# Patient Record
Sex: FEMALE | Race: WHITE | NOT HISPANIC OR LATINO | Employment: FULL TIME | URBAN - NONMETROPOLITAN AREA
[De-identification: names, ages, dates, MRNs, and addresses within clinical notes are randomized per-mention and may not be internally consistent; named-entity substitution may affect disease eponyms.]

---

## 2023-03-23 DIAGNOSIS — I10 HYPERTENSION, UNSPECIFIED TYPE: Primary | ICD-10-CM

## 2023-03-23 RX ORDER — AMLODIPINE BESYLATE 2.5 MG/1
TABLET ORAL
Qty: 90 TABLET | Refills: 3 | Status: SHIPPED | OUTPATIENT
Start: 2023-03-23 | End: 2023-08-31 | Stop reason: ALTCHOICE

## 2023-03-27 ENCOUNTER — TELEPHONE (OUTPATIENT)
Dept: PRIMARY CARE | Facility: CLINIC | Age: 58
End: 2023-03-27
Payer: COMMERCIAL

## 2023-03-27 NOTE — TELEPHONE ENCOUNTER
FAXED PAPER ORDER FOR RIGHT BREAST US LIMITED TO [facility] [phone] FAX. (THEIR PHONE # [phone]). TASK COMPLETE

## 2023-03-27 NOTE — TELEPHONE ENCOUNTER
WE DO NOT DO ORDERS IN THE SYSTEM. IF THEY WANT THE ORDER IN THE SYSTEM THE DOCTOR HAS TO DO IT OR YOU CAN FAX A PAPER ORDER

## 2023-03-27 NOTE — TELEPHONE ENCOUNTER
EMELIA [facility] BREAST HEALTH NURSE CALLED NEEDING ORDER PUT IN FOR U/S RIGHT BREAST LIMITED. PT IS COMING FOR U/S TOMORROW. THANKS

## 2023-05-22 ENCOUNTER — APPOINTMENT (OUTPATIENT)
Dept: PRIMARY CARE | Facility: CLINIC | Age: 58
End: 2023-05-22
Payer: COMMERCIAL

## 2023-06-16 PROBLEM — R09.A2 SENSATION OF LUMP IN THROAT: Status: ACTIVE | Noted: 2023-06-16

## 2023-06-16 PROBLEM — N93.9 ABNORMAL UTERINE BLEEDING (AUB): Status: ACTIVE | Noted: 2023-06-16

## 2023-06-16 PROBLEM — F41.1 ANXIETY STATE: Status: ACTIVE | Noted: 2023-06-16

## 2023-06-16 PROBLEM — E61.1 IRON DEFICIENCY: Status: ACTIVE | Noted: 2023-06-16

## 2023-06-16 PROBLEM — R73.02 GLUCOSE INTOLERANCE (IMPAIRED GLUCOSE TOLERANCE): Status: ACTIVE | Noted: 2023-06-16

## 2023-06-16 PROBLEM — G56.00 CARPAL TUNNEL SYNDROME: Status: ACTIVE | Noted: 2023-06-16

## 2023-06-16 PROBLEM — N39.3 SUI (STRESS URINARY INCONTINENCE, FEMALE): Status: ACTIVE | Noted: 2023-06-16

## 2023-06-16 PROBLEM — E65 FAT PAD: Status: ACTIVE | Noted: 2023-06-16

## 2023-06-16 PROBLEM — M62.830 LUMBAR PARASPINAL MUSCLE SPASM: Status: ACTIVE | Noted: 2023-06-16

## 2023-06-16 PROBLEM — G44.209 TENSION HEADACHE: Status: ACTIVE | Noted: 2023-06-16

## 2023-06-16 PROBLEM — N28.1 RENAL CYST: Status: ACTIVE | Noted: 2023-06-16

## 2023-06-16 PROBLEM — A04.72 C. DIFFICILE DIARRHEA: Status: ACTIVE | Noted: 2023-06-16

## 2023-06-16 PROBLEM — K57.32 DIVERTICULITIS, COLON: Status: ACTIVE | Noted: 2023-06-16

## 2023-06-16 PROBLEM — R10.9 RIGHT FLANK PAIN: Status: ACTIVE | Noted: 2023-06-16

## 2023-06-16 PROBLEM — K59.00 CONSTIPATION: Status: ACTIVE | Noted: 2023-06-16

## 2023-06-16 PROBLEM — M54.6 THORACIC BACK PAIN: Status: ACTIVE | Noted: 2023-06-16

## 2023-06-16 PROBLEM — R11.0 NAUSEA IN ADULT: Status: ACTIVE | Noted: 2023-06-16

## 2023-06-16 PROBLEM — R22.0 HEAD LUMP: Status: ACTIVE | Noted: 2023-06-16

## 2023-06-16 PROBLEM — N60.19 FIBROCYSTIC BREAST: Status: ACTIVE | Noted: 2023-06-16

## 2023-06-16 PROBLEM — Z86.16 HISTORY OF 2019 NOVEL CORONAVIRUS DISEASE (COVID-19): Status: ACTIVE | Noted: 2023-06-16

## 2023-06-16 PROBLEM — R59.0 CERVICAL LYMPHADENOPATHY: Status: ACTIVE | Noted: 2023-06-16

## 2023-06-16 PROBLEM — E04.1 THYROID NODULE: Status: ACTIVE | Noted: 2023-06-16

## 2023-06-16 PROBLEM — N95.1 PERIMENOPAUSAL: Status: ACTIVE | Noted: 2023-06-16

## 2023-06-16 PROBLEM — E04.2 MULTIPLE THYROID NODULES: Status: ACTIVE | Noted: 2023-06-16

## 2023-06-16 PROBLEM — M47.815 OSTEOARTHRITIS OF THORACOLUMBAR SPINE: Status: ACTIVE | Noted: 2023-06-16

## 2023-06-16 PROBLEM — I10 WHITE COAT SYNDROME WITH DIAGNOSIS OF HYPERTENSION: Status: ACTIVE | Noted: 2023-06-16

## 2023-06-16 PROBLEM — K52.9 COLITIS: Status: ACTIVE | Noted: 2023-06-16

## 2023-06-16 PROBLEM — M54.50 LOW BACK PAIN: Status: ACTIVE | Noted: 2023-06-16

## 2023-06-16 PROBLEM — R93.5 ABNORMAL CT OF THE ABDOMEN: Status: ACTIVE | Noted: 2023-06-16

## 2023-06-16 PROBLEM — R35.1 NOCTURIA: Status: ACTIVE | Noted: 2023-06-16

## 2023-06-16 PROBLEM — N39.0 RECURRENT UTI: Status: ACTIVE | Noted: 2023-06-16

## 2023-06-16 RX ORDER — LISINOPRIL 2.5 MG/1
2.5 TABLET ORAL DAILY
COMMUNITY
Start: 2022-06-21 | End: 2023-08-31 | Stop reason: SDUPTHER

## 2023-06-16 RX ORDER — MULTIVITAMIN
1 TABLET ORAL DAILY
COMMUNITY

## 2023-06-19 RX ORDER — AMLODIPINE BESYLATE 5 MG/1
5 TABLET ORAL DAILY
COMMUNITY
End: 2023-09-20

## 2023-06-22 ENCOUNTER — TELEPHONE (OUTPATIENT)
Dept: PRIMARY CARE | Facility: CLINIC | Age: 58
End: 2023-06-22

## 2023-06-22 ENCOUNTER — OFFICE VISIT (OUTPATIENT)
Dept: PRIMARY CARE | Facility: CLINIC | Age: 58
End: 2023-06-22
Payer: COMMERCIAL

## 2023-06-22 VITALS
HEART RATE: 74 BPM | WEIGHT: 132 LBS | BODY MASS INDEX: 24.29 KG/M2 | HEIGHT: 62 IN | DIASTOLIC BLOOD PRESSURE: 82 MMHG | SYSTOLIC BLOOD PRESSURE: 140 MMHG

## 2023-06-22 DIAGNOSIS — Z91.81 H/O FALL: ICD-10-CM

## 2023-06-22 DIAGNOSIS — E04.2 MULTINODULAR GOITER: ICD-10-CM

## 2023-06-22 DIAGNOSIS — R10.2 PELVIC PAIN: ICD-10-CM

## 2023-06-22 DIAGNOSIS — M53.3 SACRAL PAIN: ICD-10-CM

## 2023-06-22 DIAGNOSIS — R07.89 CHEST PAIN, ATYPICAL: Primary | ICD-10-CM

## 2023-06-22 PROCEDURE — 3079F DIAST BP 80-89 MM HG: CPT | Performed by: INTERNAL MEDICINE

## 2023-06-22 PROCEDURE — 3077F SYST BP >= 140 MM HG: CPT | Performed by: INTERNAL MEDICINE

## 2023-06-22 PROCEDURE — 1036F TOBACCO NON-USER: CPT | Performed by: INTERNAL MEDICINE

## 2023-06-22 PROCEDURE — 93000 ELECTROCARDIOGRAM COMPLETE: CPT | Performed by: INTERNAL MEDICINE

## 2023-06-22 PROCEDURE — 99214 OFFICE O/P EST MOD 30 MIN: CPT | Performed by: INTERNAL MEDICINE

## 2023-06-22 ASSESSMENT — ENCOUNTER SYMPTOMS
FEVER: 0
ENDOCRINE NEGATIVE: 1
HEADACHES: 0
NAUSEA: 0
ABDOMINAL DISTENTION: 0
EYES NEGATIVE: 1
DYSURIA: 0
NEUROLOGICAL NEGATIVE: 1
DIZZINESS: 0
CONSTIPATION: 0
CHILLS: 0
WHEEZING: 0
SHORTNESS OF BREATH: 0
VOMITING: 0
BACK PAIN: 1
AGITATION: 0
PSYCHIATRIC NEGATIVE: 1
ABDOMINAL PAIN: 0
RHINORRHEA: 0
WEAKNESS: 0
PALPITATIONS: 0
DIARRHEA: 0
GASTROINTESTINAL NEGATIVE: 1
COUGH: 0
LIGHT-HEADEDNESS: 0

## 2023-06-22 NOTE — TELEPHONE ENCOUNTER
PRE CERT NUCLEAR STRESS TEST  SHE WANTS TO MAKE F/U APPT WHEN YOU CALL HER DUE TO HER WORK SCHEDULE. THANKS

## 2023-06-22 NOTE — PROGRESS NOTES
Subjective   Patient ID: Dolores Evangelista is a 57 y.o. female who presents for 4 month follow up (Rev labs. ).  HPI  Lab and THYROID U/S F/U. U/S WAS DONE 5 MONTHS AGO. COMPLAINS OF L SIDED CHEST PAIN ON AND OFF 4/10 ,LASTING FOR AROUND 5 MINUTES . Pin is l sided with radiations to L ARM  S/P FALL 5 DAYS AGO (SLIPPED ON MUDDY GROUND) WITH TRAUMA TO LOWER BACK . COMPLAINS OF SACRAL AND LOW BACK PAIN SINCE THEN 6/10 AND R PELVIC BONE PAIN 5/10.  Review of Systems   Constitutional:  Negative for chills and fever.   HENT: Negative.  Negative for congestion, postnasal drip and rhinorrhea.    Eyes: Negative.  Negative for visual disturbance.   Respiratory:  Negative for cough, shortness of breath and wheezing.    Cardiovascular:  Positive for chest pain. Negative for palpitations and leg swelling.   Gastrointestinal: Negative.  Negative for abdominal distention, abdominal pain, constipation, diarrhea, nausea and vomiting.   Endocrine: Negative.    Genitourinary:  Negative for dysuria and urgency.   Musculoskeletal:  Positive for back pain.   Skin: Negative.  Negative for rash.   Allergic/Immunologic: Negative for immunocompromised state.   Neurological: Negative.  Negative for dizziness, weakness, light-headedness and headaches.   Psychiatric/Behavioral: Negative.  Negative for agitation.        Objective   Physical Exam  Constitutional:       General: She is not in acute distress.  HENT:      Head: Normocephalic.      Nose: Nose normal.      Mouth/Throat:      Mouth: Mucous membranes are moist.   Eyes:      Conjunctiva/sclera: Conjunctivae normal.      Pupils: Pupils are equal, round, and reactive to light.   Cardiovascular:      Rate and Rhythm: Normal rate and regular rhythm.      Pulses: Normal pulses.      Heart sounds: Normal heart sounds.      Comments: NO CHEST WALL TENDERNESS  Pulmonary:      Effort: No respiratory distress.      Breath sounds: No wheezing.   Chest:      Chest wall: No tenderness.   Abdominal:       General: Abdomen is flat. Bowel sounds are normal.      Palpations: Abdomen is soft.      Tenderness: There is no abdominal tenderness.   Musculoskeletal:         General: Tenderness present. Normal range of motion.      Cervical back: Normal range of motion.      Comments: SACRAL TENDERNESS, NO BRUISES   Lymphadenopathy:      Cervical: No cervical adenopathy.   Skin:     General: Skin is warm and dry.      Findings: No rash.   Neurological:      General: No focal deficit present.      Mental Status: She is alert. Mental status is at baseline.   Psychiatric:         Mood and Affect: Mood normal.         Behavior: Behavior normal.         Assessment/Plan   1. Chest pain, atypical  ECG 12 lead    XR chest 2 views    Nuclear Stress Test      2. Sacral pain  XR lumbar spine 2-3 views      3. Pelvic pain  XR pelvis 3+ views      4. H/O fall  XR lumbar spine 2-3 views    XR pelvis 3+ views      5. Multinodular goiter          ADVISED TO HAVE LOW FAT AND LOW CALORIE DIET AND TO LOOSE WEIGHT, DAILY EXERCISE.  ADVISED TO GO TO ER IF CHEST PAIN GETS WORSE.   ADVISED TO APPLY WARM COMPRESSION AND OTC PAIN CREAM PRN FOR PAIN.  ADVISED TO HAVE LOW FAT AND LOW CALORIE DIET AND TO LOOSE WEIGHT, DAILY EXERCISE.    MDM    1) COMPLEXITY: 1 UNDIAGNOSED NEW PROBLEM WITH UNCERTAIN PROGNOSIS  2)DATA: TESTS INTERPRETED AND OR ORDERED, TOOK INDEPENDENT HISTORY OR RECORDS REVIEWED  3)RISK: MODERATE RISK DUE TO NATURE OF MEDICAL CONDITIONS/COMORBIDITY OR MEDICATIONS ORDERED OR SURGICAL OR PROCEDURE REFERRAL, .       3 weeks

## 2023-07-03 ENCOUNTER — TELEPHONE (OUTPATIENT)
Dept: PRIMARY CARE | Facility: CLINIC | Age: 58
End: 2023-07-03
Payer: COMMERCIAL

## 2023-08-31 ENCOUNTER — OFFICE VISIT (OUTPATIENT)
Dept: PRIMARY CARE | Facility: CLINIC | Age: 58
End: 2023-08-31
Payer: COMMERCIAL

## 2023-08-31 VITALS
WEIGHT: 133 LBS | HEIGHT: 62 IN | DIASTOLIC BLOOD PRESSURE: 90 MMHG | BODY MASS INDEX: 24.48 KG/M2 | HEART RATE: 84 BPM | SYSTOLIC BLOOD PRESSURE: 148 MMHG

## 2023-08-31 DIAGNOSIS — I10 WHITE COAT SYNDROME WITH DIAGNOSIS OF HYPERTENSION: ICD-10-CM

## 2023-08-31 DIAGNOSIS — M62.838 MUSCLE SPASM: ICD-10-CM

## 2023-08-31 DIAGNOSIS — R07.89 CHEST WALL PAIN: Primary | ICD-10-CM

## 2023-08-31 DIAGNOSIS — I10 PRIMARY HYPERTENSION: ICD-10-CM

## 2023-08-31 PROBLEM — K52.9 COLITIS: Status: RESOLVED | Noted: 2023-06-16 | Resolved: 2023-08-31

## 2023-08-31 PROBLEM — R10.9 RIGHT FLANK PAIN: Status: RESOLVED | Noted: 2023-06-16 | Resolved: 2023-08-31

## 2023-08-31 PROBLEM — K57.32 DIVERTICULITIS, COLON: Status: RESOLVED | Noted: 2023-06-16 | Resolved: 2023-08-31

## 2023-08-31 PROBLEM — R59.0 CERVICAL LYMPHADENOPATHY: Status: RESOLVED | Noted: 2023-06-16 | Resolved: 2023-08-31

## 2023-08-31 PROBLEM — R10.2 PELVIC PAIN: Status: RESOLVED | Noted: 2023-06-22 | Resolved: 2023-08-31

## 2023-08-31 PROBLEM — R11.0 NAUSEA IN ADULT: Status: RESOLVED | Noted: 2023-06-16 | Resolved: 2023-08-31

## 2023-08-31 PROBLEM — R09.A2 SENSATION OF LUMP IN THROAT: Status: RESOLVED | Noted: 2023-06-16 | Resolved: 2023-08-31

## 2023-08-31 PROCEDURE — 3080F DIAST BP >= 90 MM HG: CPT | Performed by: INTERNAL MEDICINE

## 2023-08-31 PROCEDURE — 1036F TOBACCO NON-USER: CPT | Performed by: INTERNAL MEDICINE

## 2023-08-31 PROCEDURE — 3077F SYST BP >= 140 MM HG: CPT | Performed by: INTERNAL MEDICINE

## 2023-08-31 PROCEDURE — 99214 OFFICE O/P EST MOD 30 MIN: CPT | Performed by: INTERNAL MEDICINE

## 2023-08-31 RX ORDER — LISINOPRIL 5 MG/1
5 TABLET ORAL DAILY
Qty: 90 TABLET | Refills: 3 | Status: SHIPPED | OUTPATIENT
Start: 2023-08-31 | End: 2023-10-09 | Stop reason: SINTOL

## 2023-08-31 ASSESSMENT — ENCOUNTER SYMPTOMS
DIARRHEA: 0
ENDOCRINE NEGATIVE: 1
PALPITATIONS: 0
AGITATION: 0
CONSTIPATION: 0
EYES NEGATIVE: 1
WHEEZING: 0
VOMITING: 0
NAUSEA: 0
NEUROLOGICAL NEGATIVE: 1
GASTROINTESTINAL NEGATIVE: 1
ABDOMINAL DISTENTION: 0
DIZZINESS: 0
BACK PAIN: 0
HEADACHES: 0
FEVER: 0
ABDOMINAL PAIN: 0
LIGHT-HEADEDNESS: 0
RHINORRHEA: 0
DYSURIA: 0
PSYCHIATRIC NEGATIVE: 1
WEAKNESS: 0
MUSCULOSKELETAL NEGATIVE: 1
SHORTNESS OF BREATH: 0
CHILLS: 0
COUGH: 0

## 2023-08-31 NOTE — PROGRESS NOTES
Subjective   Patient ID: Dolores Evangelista is a 57 y.o. female who presents for Follow-up (F/U WITH STRESS TEST AND XR).  HPI  F/U ON PELVIC XR AND STRESS TEST. HTN F/U. BP AT HOME IS AROUND 120/70 AND GOEA UP DURING THE VISIT. HAS L SIDED CHEST WALL PAIN ON AND OFF 2-3/10 (LESS FREQUENT THAN LAST TIME),W/O RADIATIONS.  PELVIC PAIN IS RESOLVED.  Review of Systems   Constitutional:  Negative for chills and fever.   HENT: Negative.  Negative for congestion, postnasal drip and rhinorrhea.    Eyes: Negative.  Negative for visual disturbance.   Respiratory:  Negative for cough, shortness of breath and wheezing.    Cardiovascular:  Positive for chest pain. Negative for palpitations and leg swelling.   Gastrointestinal: Negative.  Negative for abdominal distention, abdominal pain, constipation, diarrhea, nausea and vomiting.   Endocrine: Negative.    Genitourinary:  Negative for dysuria and urgency.   Musculoskeletal: Negative.  Negative for back pain.   Skin: Negative.  Negative for rash.   Allergic/Immunologic: Negative for immunocompromised state.   Neurological: Negative.  Negative for dizziness, weakness, light-headedness and headaches.   Psychiatric/Behavioral: Negative.  Negative for agitation.        Objective   Physical Exam  Constitutional:       General: She is not in acute distress.  HENT:      Head: Normocephalic.      Nose: Nose normal.      Mouth/Throat:      Mouth: Mucous membranes are moist.   Eyes:      Conjunctiva/sclera: Conjunctivae normal.      Pupils: Pupils are equal, round, and reactive to light.   Cardiovascular:      Rate and Rhythm: Normal rate and regular rhythm.      Pulses: Normal pulses.      Heart sounds: Normal heart sounds.      Comments: NO CHEST WALL TENDERNESS  Pulmonary:      Effort: No respiratory distress.      Breath sounds: No wheezing.   Chest:      Chest wall: No tenderness.   Abdominal:      General: Abdomen is flat. Bowel sounds are normal.      Palpations: Abdomen is soft.       Tenderness: There is no abdominal tenderness.   Musculoskeletal:         General: No tenderness. Normal range of motion.      Cervical back: Normal range of motion.   Lymphadenopathy:      Cervical: No cervical adenopathy.   Skin:     General: Skin is warm and dry.      Findings: No rash.   Neurological:      General: No focal deficit present.      Mental Status: She is alert. Mental status is at baseline.   Psychiatric:         Mood and Affect: Mood normal.         Behavior: Behavior normal.         Assessment/Plan   1. White coat syndrome with diagnosis of hypertension        2. Primary hypertension  lisinopril 5 mg tablet      3. Chest wall pain        4. Muscle spasm        TEST RESULTS WERE DISCUSSED.  ADVISED TO HAVE LOW FAT AND LOW CALORIE DIET AND TO LOOSE WEIGHT, DAILY EXERCISE.  ADVISED TO APPLY WARM COMPRESSION AND OTC PAIN CREAM PRN FOR PAIN.  MONITOR BP   GOAL BP LOWER THAN 130/80  LOW SALT  EXERCISE DAILY    MDM    1) COMPLEXITY: 1 UNDIAGNOSED NEW PROBLEM WITH UNCERTAIN PROGNOSIS  2)DATA: TESTS INTERPRETED AND OR ORDERED, TOOK INDEPENDENT HISTORY OR RECORDS REVIEWED  3)RISK: MODERATE RISK DUE TO NATURE OF MEDICAL CONDITIONS/COMORBIDITY OR MEDICATIONS ORDERED OR SURGICAL OR PROCEDURE REFERRAL, .         4 weeks

## 2023-09-20 DIAGNOSIS — I10 WHITE COAT SYNDROME WITH DIAGNOSIS OF HYPERTENSION: Primary | ICD-10-CM

## 2023-09-20 RX ORDER — AMLODIPINE BESYLATE 5 MG/1
TABLET ORAL
Qty: 90 TABLET | Refills: 3 | Status: SHIPPED | OUTPATIENT
Start: 2023-09-20 | End: 2023-10-09 | Stop reason: SINTOL

## 2023-09-28 ENCOUNTER — APPOINTMENT (OUTPATIENT)
Dept: PRIMARY CARE | Facility: CLINIC | Age: 58
End: 2023-09-28
Payer: COMMERCIAL

## 2023-10-09 ENCOUNTER — OFFICE VISIT (OUTPATIENT)
Dept: PRIMARY CARE | Facility: CLINIC | Age: 58
End: 2023-10-09
Payer: COMMERCIAL

## 2023-10-09 VITALS
HEIGHT: 62 IN | BODY MASS INDEX: 24.66 KG/M2 | SYSTOLIC BLOOD PRESSURE: 126 MMHG | WEIGHT: 134 LBS | HEART RATE: 85 BPM | DIASTOLIC BLOOD PRESSURE: 70 MMHG

## 2023-10-09 DIAGNOSIS — F41.9 ANXIETY: ICD-10-CM

## 2023-10-09 DIAGNOSIS — Z78.9 ACE INHIBITOR INTOLERANCE: ICD-10-CM

## 2023-10-09 DIAGNOSIS — I10 WHITE COAT SYNDROME WITH DIAGNOSIS OF HYPERTENSION: Primary | ICD-10-CM

## 2023-10-09 PROCEDURE — 1036F TOBACCO NON-USER: CPT | Performed by: INTERNAL MEDICINE

## 2023-10-09 PROCEDURE — 99214 OFFICE O/P EST MOD 30 MIN: CPT | Performed by: INTERNAL MEDICINE

## 2023-10-09 PROCEDURE — 3074F SYST BP LT 130 MM HG: CPT | Performed by: INTERNAL MEDICINE

## 2023-10-09 PROCEDURE — 3078F DIAST BP <80 MM HG: CPT | Performed by: INTERNAL MEDICINE

## 2023-10-09 RX ORDER — LOSARTAN POTASSIUM 25 MG/1
25 TABLET ORAL DAILY
Qty: 90 TABLET | Refills: 3 | Status: SHIPPED | OUTPATIENT
Start: 2023-10-09 | End: 2023-10-30 | Stop reason: SDUPTHER

## 2023-10-09 RX ORDER — BUSPIRONE HYDROCHLORIDE 10 MG/1
10 TABLET ORAL 3 TIMES DAILY PRN
Qty: 90 TABLET | Refills: 0 | Status: SHIPPED | OUTPATIENT
Start: 2023-10-09 | End: 2024-10-08

## 2023-10-09 ASSESSMENT — ENCOUNTER SYMPTOMS
DIZZINESS: 0
VOMITING: 0
PALPITATIONS: 0
CHILLS: 0
RHINORRHEA: 0
NAUSEA: 0
DYSURIA: 0
DIARRHEA: 0
NERVOUS/ANXIOUS: 1
BACK PAIN: 0
MUSCULOSKELETAL NEGATIVE: 1
COUGH: 0
GASTROINTESTINAL NEGATIVE: 1
AGITATION: 0
SHORTNESS OF BREATH: 0
ABDOMINAL DISTENTION: 0
LIGHT-HEADEDNESS: 0
HEADACHES: 0
ABDOMINAL PAIN: 0
NEUROLOGICAL NEGATIVE: 1
WEAKNESS: 0
CARDIOVASCULAR NEGATIVE: 1
WHEEZING: 0
EYES NEGATIVE: 1
ENDOCRINE NEGATIVE: 1
FEVER: 0
CONSTIPATION: 0

## 2023-10-09 NOTE — PROGRESS NOTES
Subjective   Patient ID: Dolores Evangelista is a 58 y.o. female who presents for Follow-up (1 MO BP CHECK).  HPI  F/U ON HTN. BP WAS AROUND 160/90 THIS MORNING . LISINOPRIL CAUSES DRY COUGH AND NORVASC CAUSE LEG EDEMA. PER PT SHE GETS STRESSED OUT BY COMING TO OFFICE WHICH ELEVATES THE BP.  Review of Systems   Constitutional:  Negative for chills and fever.   HENT: Negative.  Negative for congestion, postnasal drip and rhinorrhea.    Eyes: Negative.  Negative for visual disturbance.   Respiratory:  Negative for cough, shortness of breath and wheezing.    Cardiovascular: Negative.  Negative for chest pain, palpitations and leg swelling.   Gastrointestinal: Negative.  Negative for abdominal distention, abdominal pain, constipation, diarrhea, nausea and vomiting.   Endocrine: Negative.    Genitourinary:  Negative for dysuria and urgency.   Musculoskeletal: Negative.  Negative for back pain.   Skin: Negative.  Negative for rash.   Allergic/Immunologic: Negative for immunocompromised state.   Neurological: Negative.  Negative for dizziness, weakness, light-headedness and headaches.   Psychiatric/Behavioral:  Negative for agitation. The patient is nervous/anxious.        Objective   Physical Exam  Constitutional:       General: She is not in acute distress.  HENT:      Head: Normocephalic.      Nose: Nose normal.      Mouth/Throat:      Mouth: Mucous membranes are moist.   Eyes:      Conjunctiva/sclera: Conjunctivae normal.      Pupils: Pupils are equal, round, and reactive to light.   Cardiovascular:      Rate and Rhythm: Normal rate and regular rhythm.      Pulses: Normal pulses.      Heart sounds: Normal heart sounds.   Pulmonary:      Effort: No respiratory distress.      Breath sounds: No wheezing.   Chest:      Chest wall: No tenderness.   Abdominal:      General: Abdomen is flat. Bowel sounds are normal.      Palpations: Abdomen is soft.      Tenderness: There is no abdominal tenderness.   Musculoskeletal:          General: No tenderness. Normal range of motion.      Cervical back: Normal range of motion.   Lymphadenopathy:      Cervical: No cervical adenopathy.   Skin:     General: Skin is warm and dry.      Findings: No rash.   Neurological:      General: No focal deficit present.      Mental Status: She is alert. Mental status is at baseline.   Psychiatric:         Mood and Affect: Mood normal.         Behavior: Behavior normal.         Assessment/Plan   1. White coat syndrome with diagnosis of hypertension  busPIRone (Buspar) 10 mg tablet    losartan (Cozaar) 25 mg tablet      2. Anxiety  busPIRone (Buspar) 10 mg tablet      3. ACE inhibitor intolerance        ADVISED TO STOP THE NORVASC AND LISINOPRIL.  MONITOR BP   GOAL BP LOWER THAN 130/80  LOW SALT  EXERCISE DAILY  ALL ASSESSED CHRONIC CONDITIONS ARE STABLE OR ADDRESSED.  ADVISED FOR RELAXATION TECHNIQUES AND TO CUT DOWN ON CAFFEINE.    MDM    1) COMPLEXITY: MORE THAN 1 STABLE CHRONIC CONDITION ADDRESSED  2)DATA: TESTS INTERPRETED AND OR ORDERED, TOOK INDEPENDENT HISTORY OR RECORDS REVIEWED  3)RISK: MODERATE RISK DUE TO NATURE OF MEDICAL CONDITIONS/COMORBIDITY OR MEDICATIONS ORDERED OR SURGICAL OR PROCEDURE REFERRAL, .         1 mon

## 2023-10-17 ENCOUNTER — TELEPHONE (OUTPATIENT)
Dept: PRIMARY CARE | Facility: CLINIC | Age: 58
End: 2023-10-17
Payer: COMMERCIAL

## 2023-10-17 NOTE — TELEPHONE ENCOUNTER
PATIENT CALLED - SHE WAS SEEN IN THE OFFICE RECENTLY AND BLOOD PRESSURE MED WAS CHANGED TO [place]. SHE HAS BEEN TAKING MED FOR 4 DAYS NOW AND BLOOD PRESSURE READINGS ARE:  139-74  134/76  137/78  141/83  130/73  129/72  124/76  NO SIDE EFFECTS NOTICED YET. DO YOU WANT HER TO INCREASE DOSE OR CONTINUE SAME AND MONITOR? PLEASE ADVISE.

## 2023-10-30 ENCOUNTER — TELEPHONE (OUTPATIENT)
Dept: PRIMARY CARE | Facility: CLINIC | Age: 58
End: 2023-10-30
Payer: COMMERCIAL

## 2023-10-30 DIAGNOSIS — I10 WHITE COAT SYNDROME WITH DIAGNOSIS OF HYPERTENSION: ICD-10-CM

## 2023-10-30 DIAGNOSIS — R92.8 ABNORMAL MAMMOGRAM: Primary | ICD-10-CM

## 2023-10-30 RX ORDER — LOSARTAN POTASSIUM 50 MG/1
50 TABLET ORAL DAILY
Qty: 90 TABLET | Refills: 3 | Status: SHIPPED | OUTPATIENT
Start: 2023-10-30 | End: 2023-11-09

## 2023-10-31 NOTE — TELEPHONE ENCOUNTER
SPOKE WITH PT AND SHE STATES THAT SHE WAS ADMITTED TO \[facility]\"" FOR A HEART ATTACK BROUGHT ON BY STRESS. SHE IS BEING DC TODAY AND THEY HAD CHANGED SOME MEDS. SHE WILL CALL AND SCHEDULE A F/U APPT ONCE SHE IS HOME AND SETTLED. SHE HAS US MALLY LAWRENCE SCHEDULED WITH [facility] FOR TOMORROW 11/1/23

## 2023-11-01 ENCOUNTER — PATIENT OUTREACH (OUTPATIENT)
Dept: CARE COORDINATION | Facility: CLINIC | Age: 58
End: 2023-11-01
Payer: COMMERCIAL

## 2023-11-01 RX ORDER — NAPROXEN SODIUM 220 MG/1
81 TABLET, FILM COATED ORAL DAILY
COMMUNITY

## 2023-11-01 RX ORDER — METOPROLOL SUCCINATE 25 MG/1
25 TABLET, EXTENDED RELEASE ORAL DAILY
COMMUNITY

## 2023-11-01 NOTE — PROGRESS NOTES
Discharge Facility: [facility]  Discharge Diagnosis: NSTEMI, chest pain  Admission Date: 10/30/2023  Discharge Date: 10/31/2023    PCP Appointment Date:  -11/9/2023 1045    Specialist Appointment Date:   -Dr. Jesus Brand Cardiology 11/7/2023    Hospital Encounter and Summary: Linked     See discharge assessment below for further details    Engagement  Call Start Time: 1119 (11/1/2023 11:20 AM)    Medications  Medications reviewed with patient/caregiver?: Yes (new prescriptions reviewed; aspirin, metoprolol succinate, sacubitril-valsartan) (11/1/2023 11:20 AM)  Is the patient having any side effects they believe may be caused by any medication additions or changes?: No (11/1/2023 11:20 AM)  Does the patient have all medications ordered at discharge?: Yes (11/1/2023 11:20 AM)  Care Management Interventions: No intervention needed (11/1/2023 11:20 AM)  Is the patient taking all medications as directed (includes completed medication regime)?: Yes (11/1/2023 11:20 AM)    Appointments  Does the patient have a primary care provider?: Yes (11/1/2023 11:20 AM)  Care Management Interventions: Verified appointment date/time/provider (11/1/2023 11:20 AM)  Has the patient kept scheduled appointments due by today?: Yes (11/1/2023 11:20 AM)    Self Management  Has home health visited the patient within 72 hours of discharge?: Not applicable (11/1/2023 11:20 AM)    Patient Teaching  Does the patient have access to their discharge instructions?: Yes (11/1/2023 11:20 AM)  Care Management Interventions: Reviewed instructions with patient (11/1/2023 11:20 AM)  What is the patient's perception of their health status since discharge?: Improving (11/1/2023 11:20 AM)  Is the patient/caregiver able to teach back the hierarchy of who to call/visit for symptoms/problems? PCP, Specialist, Home Health nurse, Urgent Care, ED, 911: Yes (11/1/2023 11:20 AM)    Wrap Up  Wrap Up Additional Comments: Pt was admitted to [facility] 10/30-10/31/2023 for chest pain and NSTEMI. Pt had left heart catheterization during admission. Pt discharged with changes to medications including discontinuing lisinopril and losartan. Pt discharged with aspirin, metoprolol succinate and sacubitril-valsartan. Pt has follow up appt with cardiology on 11/7. Pt scheduled for PCP follow up 11/9/2023 1045. Pt reports she is doing well since discharge and that her blood pressures have been good. Pt states she is going for a breast ultrasound today. Pt reports understanding of discharge instructions and denies any questions at this time. Pt encouraged to call if questions or needs arise. (11/1/2023 11:20 AM)  Call End Time: 1131 (11/1/2023 11:20 AM)

## 2023-11-09 ENCOUNTER — OFFICE VISIT (OUTPATIENT)
Dept: PRIMARY CARE | Facility: CLINIC | Age: 58
End: 2023-11-09
Payer: COMMERCIAL

## 2023-11-09 VITALS
BODY MASS INDEX: 24.44 KG/M2 | SYSTOLIC BLOOD PRESSURE: 153 MMHG | HEIGHT: 62 IN | HEART RATE: 61 BPM | WEIGHT: 132.8 LBS | DIASTOLIC BLOOD PRESSURE: 90 MMHG

## 2023-11-09 DIAGNOSIS — F41.9 ANXIETY: ICD-10-CM

## 2023-11-09 DIAGNOSIS — I51.81 TAKOTSUBO CARDIOMYOPATHY: Primary | ICD-10-CM

## 2023-11-09 DIAGNOSIS — I21.4 NSTEMI (NON-ST ELEVATED MYOCARDIAL INFARCTION) (MULTI): ICD-10-CM

## 2023-11-09 DIAGNOSIS — I10 BENIGN ESSENTIAL HTN: ICD-10-CM

## 2023-11-09 PROBLEM — R79.89 ELEVATED TROPONIN: Status: RESOLVED | Noted: 2023-10-31 | Resolved: 2023-11-09

## 2023-11-09 PROBLEM — D64.9 ANEMIA: Status: ACTIVE | Noted: 2023-10-31

## 2023-11-09 PROCEDURE — 1036F TOBACCO NON-USER: CPT | Performed by: INTERNAL MEDICINE

## 2023-11-09 PROCEDURE — 3077F SYST BP >= 140 MM HG: CPT | Performed by: INTERNAL MEDICINE

## 2023-11-09 PROCEDURE — 3080F DIAST BP >= 90 MM HG: CPT | Performed by: INTERNAL MEDICINE

## 2023-11-09 PROCEDURE — 99214 OFFICE O/P EST MOD 30 MIN: CPT | Performed by: INTERNAL MEDICINE

## 2023-11-09 RX ORDER — AMLODIPINE BESYLATE 5 MG/1
5 TABLET ORAL DAILY
COMMUNITY

## 2023-11-09 ASSESSMENT — ENCOUNTER SYMPTOMS
LIGHT-HEADEDNESS: 0
MUSCULOSKELETAL NEGATIVE: 1
WHEEZING: 0
DIZZINESS: 0
GASTROINTESTINAL NEGATIVE: 1
FEVER: 0
NAUSEA: 0
ENDOCRINE NEGATIVE: 1
BACK PAIN: 0
DYSURIA: 0
ABDOMINAL PAIN: 0
VOMITING: 0
DIARRHEA: 0
WEAKNESS: 0
COUGH: 0
RHINORRHEA: 0
AGITATION: 0
NEUROLOGICAL NEGATIVE: 1
PSYCHIATRIC NEGATIVE: 1
CHILLS: 0
ABDOMINAL DISTENTION: 0
CARDIOVASCULAR NEGATIVE: 1
SHORTNESS OF BREATH: 0
HEADACHES: 0
CONSTIPATION: 0
PALPITATIONS: 0
EYES NEGATIVE: 1

## 2023-11-09 ASSESSMENT — PATIENT HEALTH QUESTIONNAIRE - PHQ9
1. LITTLE INTEREST OR PLEASURE IN DOING THINGS: NOT AT ALL
SUM OF ALL RESPONSES TO PHQ9 QUESTIONS 1 AND 2: 0
2. FEELING DOWN, DEPRESSED OR HOPELESS: NOT AT ALL

## 2023-11-09 NOTE — PROGRESS NOTES
Subjective   Patient ID: Dolores Evangelista is a 58 y.o. female who presents for Follow-up (HOSP DC F/U [facility]-HEART CATH DONE-SEEING CARDIO WITH THERON).  HPI  F/U AFTER ER VISIT AND HOSPITAL STAY FOR CHEST PAIN AND WAS DX WITH MI AND STRESS CARDIOMYOPATHY .HAD LABS AND STRESS TEST AND CARDIAC CATH DONE . HAD NO CHOLESTEROL PLAQUE AND DIDN'T NEED STENT PLACEMENT . MEDICAL TX WAS OPTIMIZED.  CHEST PAIN STARTED AFTER EXERTING HERSELF MORE THAN NORMAL (MULTIPLE HOURS OF CLEANING /PHYSICAL WORK) . TROPONIN WENT UP TO THOUSANDS WITHIN COUPLE OF HOURS DURING THE ER VISIT AND GRADUALLY CAME DOWN .  Review of Systems   Constitutional:  Negative for chills and fever.   HENT: Negative.  Negative for congestion, postnasal drip and rhinorrhea.    Eyes: Negative.  Negative for visual disturbance.   Respiratory:  Negative for cough, shortness of breath and wheezing.    Cardiovascular: Negative.  Negative for chest pain, palpitations and leg swelling.   Gastrointestinal: Negative.  Negative for abdominal distention, abdominal pain, constipation, diarrhea, nausea and vomiting.   Endocrine: Negative.    Genitourinary:  Negative for dysuria and urgency.   Musculoskeletal: Negative.  Negative for back pain.   Skin: Negative.  Negative for rash.   Allergic/Immunologic: Negative for immunocompromised state.   Neurological: Negative.  Negative for dizziness, weakness, light-headedness and headaches.   Psychiatric/Behavioral: Negative.  Negative for agitation.        Objective   Physical Exam  Constitutional:       General: She is not in acute distress.  HENT:      Head: Normocephalic.      Nose: Nose normal.      Mouth/Throat:      Mouth: Mucous membranes are moist.   Eyes:      Conjunctiva/sclera: Conjunctivae normal.      Pupils: Pupils are equal, round, and reactive to light.   Cardiovascular:      Rate and Rhythm: Normal rate and regular rhythm.      Pulses: Normal pulses.      Heart sounds: Normal heart sounds.   Pulmonary:      Effort: No  respiratory distress.      Breath sounds: No wheezing.   Chest:      Chest wall: No tenderness.   Abdominal:      General: Abdomen is flat. Bowel sounds are normal.      Palpations: Abdomen is soft.      Tenderness: There is no abdominal tenderness.   Musculoskeletal:         General: No tenderness. Normal range of motion.      Cervical back: Normal range of motion.   Lymphadenopathy:      Cervical: No cervical adenopathy.   Skin:     General: Skin is warm and dry.      Findings: No rash.   Neurological:      General: No focal deficit present.      Mental Status: She is alert. Mental status is at baseline.   Psychiatric:         Mood and Affect: Mood normal.         Behavior: Behavior normal.         Assessment/Plan   1. Takotsubo cardiomyopathy        2. NSTEMI (non-ST elevated myocardial infarction) (CMS/HCC)        3. Benign essential HTN        4. Anxiety          TEST RESULTS WERE DISCUSSED.  MONITOR BP   GOAL BP LOWER THAN 130/80  LOW SALT  EXERCISE DAILY  ADVISED FOR RELAXATION TECHNIQUES AND TO CUT DOWN ON CAFFEINE.  MONITOR BP   GOAL BP LOWER THAN 130/80  LOW SALT  EXERCISE DAILY  ADVISED TO HAVE LOW FAT AND LOW CALORIE DIET AND TO LOOSE WEIGHT, DAILY EXERCISE AND TO KEEP THE CARDIO F/U.    MDM    1) COMPLEXITY: 1 UNDIAGNOSED NEW PROBLEM WITH UNCERTAIN PROGNOSIS  2)DATA: TESTS INTERPRETED AND OR ORDERED, TOOK INDEPENDENT HISTORY OR RECORDS REVIEWED  3)RISK: MODERATE RISK DUE TO NATURE OF MEDICAL CONDITIONS/COMORBIDITY OR MEDICATIONS ORDERED OR SURGICAL OR PROCEDURE REFERRAL, .       1 mon

## 2023-11-13 ENCOUNTER — APPOINTMENT (OUTPATIENT)
Dept: PRIMARY CARE | Facility: CLINIC | Age: 58
End: 2023-11-13
Payer: COMMERCIAL

## 2023-11-14 ENCOUNTER — PATIENT OUTREACH (OUTPATIENT)
Dept: CARE COORDINATION | Facility: CLINIC | Age: 58
End: 2023-11-14
Payer: COMMERCIAL

## 2023-11-14 NOTE — PROGRESS NOTES
Unable to reach patient for call back after patient's follow up appointment with PCP.   Left voicemail with call back number for patient to call if needed   If no voicemail available call attempts x 2 were made to contact the patient to assist with any questions or concerns patient may have.

## 2023-12-04 ENCOUNTER — APPOINTMENT (OUTPATIENT)
Dept: PRIMARY CARE | Facility: CLINIC | Age: 58
End: 2023-12-04
Payer: COMMERCIAL

## 2023-12-13 ENCOUNTER — PATIENT OUTREACH (OUTPATIENT)
Dept: CARE COORDINATION | Facility: CLINIC | Age: 58
End: 2023-12-13
Payer: COMMERCIAL

## 2024-01-04 ENCOUNTER — APPOINTMENT (OUTPATIENT)
Dept: PRIMARY CARE | Facility: CLINIC | Age: 59
End: 2024-01-04
Payer: COMMERCIAL

## 2025-09-23 ENCOUNTER — APPOINTMENT (OUTPATIENT)
Dept: UROLOGY | Facility: CLINIC | Age: 60
End: 2025-09-23
Payer: COMMERCIAL